# Patient Record
Sex: FEMALE | URBAN - METROPOLITAN AREA
[De-identification: names, ages, dates, MRNs, and addresses within clinical notes are randomized per-mention and may not be internally consistent; named-entity substitution may affect disease eponyms.]

---

## 2021-02-24 ENCOUNTER — APPOINTMENT (RX ONLY)
Dept: URBAN - METROPOLITAN AREA CLINIC 55 | Facility: CLINIC | Age: 71
Setting detail: DERMATOLOGY
End: 2021-02-24

## 2021-02-24 DIAGNOSIS — Z41.9 ENCOUNTER FOR PROCEDURE FOR PURPOSES OTHER THAN REMEDYING HEALTH STATE, UNSPECIFIED: ICD-10-CM

## 2021-02-24 PROCEDURE — ? FILLERS

## 2021-02-24 PROCEDURE — ? DYSPORT

## 2021-02-24 NOTE — PROCEDURE: DYSPORT
Additional Area 2 Units: 0
Show Lateral Platysmal Band Units: Yes
Glabellar Complex Units: 50
Show Mentalis Units: No
Detail Level: Simple
Additional Area 1 Location: chin
Dilution (U/0.1 Cc): 10
Post-Care Instructions: After care instructions were provided to the patient.
Expiration Date (Month Year): 9/30/2021
Consent: Written consent was obtained.
Lot #: [id number]

## 2021-02-24 NOTE — PROCEDURE: FILLERS
Include Cannula Information In Note?: No
Tear Troughs Filler Volume In Cc: 0
Anesthesia Type: 1% lidocaine with epinephrine
Filler Comments: Lips, ML, distal NLF
Include Cannula Size?: 27G
Detail Level: Simple
Filler Comments: TINY
Expiration Date (Month Year): 7/31/2023
Filler: Rocío Garcia
Map Statment: See Attach Map for Complete Details
Filler: Restylane Kysse
Anesthesia Volume In Cc: 0.5
Consent: Written consent was obtained.
Lot #: [id number]
Filler Comments: Cheeks
Post-Care Instructions: After care instructions were provided to the patient.
Lot #: [id number]

## 2021-09-14 ENCOUNTER — APPOINTMENT (RX ONLY)
Dept: URBAN - METROPOLITAN AREA CLINIC 55 | Facility: CLINIC | Age: 71
Setting detail: DERMATOLOGY
End: 2021-09-14

## 2021-09-14 DIAGNOSIS — Z41.9 ENCOUNTER FOR PROCEDURE FOR PURPOSES OTHER THAN REMEDYING HEALTH STATE, UNSPECIFIED: ICD-10-CM

## 2021-09-14 PROCEDURE — ? HYALURONIDASE INJECTION

## 2021-09-14 PROCEDURE — ? FILLERS

## 2021-09-14 PROCEDURE — ? DYSPORT

## 2021-09-14 ASSESSMENT — LOCATION SIMPLE DESCRIPTION DERM
LOCATION SIMPLE: LEFT CHEEK
LOCATION SIMPLE: RIGHT CHEEK

## 2021-09-14 ASSESSMENT — LOCATION DETAILED DESCRIPTION DERM
LOCATION DETAILED: LEFT SUPERIOR MEDIAL MALAR CHEEK
LOCATION DETAILED: RIGHT SUPERIOR CENTRAL MALAR CHEEK

## 2021-09-14 ASSESSMENT — LOCATION ZONE DERM: LOCATION ZONE: FACE

## 2021-09-14 NOTE — PROCEDURE: HYALURONIDASE INJECTION
Total Volume (Ccs): 0.2
Detail Level: Detailed
Hyaluronidase Preparation: hyaluronidase
Consent: The risks of contour defects and dimpling of the skin were reviewed with the patient prior to the injection.

## 2021-09-14 NOTE — PROCEDURE: FILLERS
Temple Hollows Filler Volume In Cc: 0
Include Cannula Information In Note?: Yes
Detail Level: Simple
Map Statment: See Attach Map for Complete Details
Include Cannula Information In Note?: No
Lot #: [id number]
Expiration Date (Month Year): 08/24/23
Include Cannula Size?: 27G
Expiration Date (Month Year): 08/28/23
Post-Care Instructions: After care instructions were provided to the patient.
Filler: RHA 3
Consent: Written consent was obtained.
Lot #: [id number]
Anesthesia Volume In Cc: 0.5
Anesthesia Type: 1% lidocaine with epinephrine
Filler: Restylane Defyne

## 2021-09-14 NOTE — PROCEDURE: DYSPORT
Glabellar Complex Units: 50
Show Ucl Units: No
Left Periorbital Skin Units: 0
Detail Level: Simple
Show Additional Area 5: Yes
Expiration Date (Month Year): 05/31/22
Post-Care Instructions: After care instructions were provided to the patient.
Lot #: [id number]
Consent: Written consent was obtained.
Dilution (U/0.1 Cc): 10
Additional Area 1 Location: upper lip

## 2021-09-28 ENCOUNTER — RX ONLY (OUTPATIENT)
Age: 71
Setting detail: RX ONLY
End: 2021-09-28

## 2021-09-28 ENCOUNTER — APPOINTMENT (RX ONLY)
Dept: URBAN - METROPOLITAN AREA CLINIC 55 | Facility: CLINIC | Age: 71
Setting detail: DERMATOLOGY
End: 2021-09-28

## 2021-09-28 DIAGNOSIS — Z41.9 ENCOUNTER FOR PROCEDURE FOR PURPOSES OTHER THAN REMEDYING HEALTH STATE, UNSPECIFIED: ICD-10-CM

## 2021-09-28 PROCEDURE — ? FILLERS

## 2021-09-28 RX ORDER — VALACYCLOVIR 500 MG/1
TABLET, FILM COATED ORAL
Qty: 10 | Refills: 1 | Status: ERX | COMMUNITY
Start: 2021-09-28

## 2021-09-28 NOTE — PROCEDURE: FILLERS
Jawline Filler Volume In Cc: 0
Filler: RHA 3
Include Cannula Size?: 27G
Include Cannula Information In Note?: No
Map Statment: See Attach Map for Complete Details
Lot #: [id number]
Expiration Date (Month Year): 01/24
Include Cannula Information In Note?: Yes
Anesthesia Volume In Cc: 0.5
Anesthesia Type: 1% lidocaine with epinephrine
Post-Care Instructions: After care instructions were provided to the patient.
Consent: Written consent was obtained.
Detail Level: Simple

## 2021-10-12 ENCOUNTER — APPOINTMENT (RX ONLY)
Dept: URBAN - METROPOLITAN AREA CLINIC 55 | Facility: CLINIC | Age: 71
Setting detail: DERMATOLOGY
End: 2021-10-12

## 2021-10-12 DIAGNOSIS — Z41.9 ENCOUNTER FOR PROCEDURE FOR PURPOSES OTHER THAN REMEDYING HEALTH STATE, UNSPECIFIED: ICD-10-CM

## 2021-10-12 PROCEDURE — ? INSTALIFT THREADING

## 2021-10-12 NOTE — PROCEDURE: INSTALIFT THREADING
Postcare Statement Text: There were no complications. The patient was given post care instructions and cold packs.
Post-Care Instructions (For The Patient Hand-Out): I reviewed with the patient in detail post-care instructions. Patient should apply ice packs multiple times a day for a couple days. They were instructed to call if they have any problems.
Procedure Text: An 18 gauge needle was used to create the insertions points and the needles with absorbable sutures were inserted subcutaneously in each direction and advanced through to the exit points on either side. The threads were then tightened and cut adjacent to the exit points and allowed to retract into the subcutaneous space.
Anesthesia Type: 1% lidocaine with epinephrine
Number Of Threads: 6
Detail Level: Zone
Anesthesia Method: local infiltration
Pre-Procedure Text: The treatment areas were prepped with alcohol and chlorhexidine. Insertion and exit points were mapped out with the Silhouette ruler and marked with a surgical marker.
Consent: The risks and benefits of the procedure were discussed with the patient.  Specifically the risks of swelling, bruising, bleeding, discomfort, infection, damage to nerves, numbness, allergic reactions, pigment changes, partial correction, rippling or dimpling, asymmetry, redness, bumps or nodules, visibility of threads, and scarring were reviewed. After this, consent was obtained.
Total Anesthesia Volume In Cc (Optional): 0

## 2021-10-21 ENCOUNTER — APPOINTMENT (RX ONLY)
Dept: URBAN - METROPOLITAN AREA CLINIC 55 | Facility: CLINIC | Age: 71
Setting detail: DERMATOLOGY
End: 2021-10-21

## 2021-10-21 ENCOUNTER — RX ONLY (OUTPATIENT)
Age: 71
Setting detail: RX ONLY
End: 2021-10-21

## 2021-10-21 RX ORDER — AMOXICILLIN AND CLAVULANATE POTASSIUM 875; 125 MG/1; MG/1
TABLET, FILM COATED ORAL
Qty: 14 | Refills: 0 | Status: ERX | COMMUNITY
Start: 2021-10-21

## 2021-10-21 RX ORDER — AMOXICILLIN AND CLAVULANATE POTASSIUM 875; 125 MG/1; 1/1
TABLET, FILM COATED ORAL
Qty: 14 | Refills: 0 | Status: CANCELLED

## 2022-05-25 ENCOUNTER — APPOINTMENT (RX ONLY)
Dept: URBAN - METROPOLITAN AREA CLINIC 55 | Facility: CLINIC | Age: 72
Setting detail: DERMATOLOGY
End: 2022-05-25

## 2022-05-25 DIAGNOSIS — Z41.9 ENCOUNTER FOR PROCEDURE FOR PURPOSES OTHER THAN REMEDYING HEALTH STATE, UNSPECIFIED: ICD-10-CM

## 2022-05-25 PROCEDURE — ? FILLERS

## 2022-05-25 PROCEDURE — ? DYSPORT

## 2022-05-25 NOTE — PROCEDURE: FILLERS
Include Cannula Information In Note?: No
Additional Area 1 Volume In Cc: 0
Map Statment: See Attach Map for Complete Details
Include Cannula Size?: 27G
Aspiration Statement: Aspiration was performed prior to injecting site with filler.
Expiration Date (Month Year): 10/11/2024
Consent: Written consent was obtained.
Lot #: [id number]
Lot #: [id number]
Filler: RHA 3
Anesthesia Type: 1% lidocaine with epinephrine
Include Cannula Information In Note?: Yes
Post-Care Instructions: After care instructions were provided to the patient.
Anesthesia Volume In Cc: 0.5
Detail Level: Simple

## 2022-05-25 NOTE — PROCEDURE: DYSPORT
R Brow Units: 0
Show Mentalis Units: No
Show Additional Area 3: Yes
Consent: Written consent was obtained.
Dilution (U/0.1 Cc): 10
Post-Care Instructions: After care instructions were provided to the patient.
Expiration Date (Month Year): 12/31/2022
Glabellar Complex Units: 50
Lot #: [id number]
Additional Area 1 Location: upper lip
Additional Area 2 Location: chin
Detail Level: Simple

## 2022-12-07 ENCOUNTER — APPOINTMENT (RX ONLY)
Dept: URBAN - METROPOLITAN AREA CLINIC 55 | Facility: CLINIC | Age: 72
Setting detail: DERMATOLOGY
End: 2022-12-07

## 2022-12-07 DIAGNOSIS — Z41.9 ENCOUNTER FOR PROCEDURE FOR PURPOSES OTHER THAN REMEDYING HEALTH STATE, UNSPECIFIED: ICD-10-CM

## 2022-12-07 PROCEDURE — ? INVENTORY

## 2022-12-07 PROCEDURE — ? DYSPORT

## 2022-12-07 PROCEDURE — ? FILLERS

## 2022-12-07 PROCEDURE — ? HYALURONIDASE INJECTION

## 2022-12-07 ASSESSMENT — LOCATION SIMPLE DESCRIPTION DERM
LOCATION SIMPLE: RIGHT CHEEK
LOCATION SIMPLE: LEFT CHEEK

## 2022-12-07 ASSESSMENT — LOCATION DETAILED DESCRIPTION DERM
LOCATION DETAILED: LEFT CENTRAL MALAR CHEEK
LOCATION DETAILED: RIGHT CENTRAL MALAR CHEEK

## 2022-12-07 ASSESSMENT — LOCATION ZONE DERM: LOCATION ZONE: FACE

## 2022-12-07 NOTE — PROCEDURE: FILLERS
Lateral Face Filler Volume In Cc: 0
Detail Level: Detailed
Consent was obtained.
Use Map Statement For Sites (Optional): No
Map Statment: See Attach Map for Complete Details
Filler: RHA 1
Post-Care Instructions: After care instructions were provided verbally and in writing.
Filler: RHA 3
Include Cannula Information In Note?: Yes
Anesthesia Type: 1% lidocaine with epinephrine
Anesthesia Volume In Cc: 0.5
Include Cannula Size?: 27G

## 2022-12-07 NOTE — PROCEDURE: DYSPORT
Nasal Root Units: 0
Show Right And Left Brow Units: No
Show Depressor Anguli Units: Yes
Forehead Units: 10
Glabellar Complex Units: 50
Detail Level: Detailed
Post-Care Instructions: Patient instructed to not lie down for 4 hours and limit physical activity for 24 hours.
Show Inventory Tab: Show
Consent was obtained.

## 2022-12-07 NOTE — PROCEDURE: HYALURONIDASE INJECTION
Detail Level: Detailed
Consent: The risks of contour defects and dimpling of the skin were reviewed with the patient prior to the injection.
Hyaluronidase Preparation: hyaluronidase
Total Volume (Ccs): 0.3

## 2023-01-18 ENCOUNTER — APPOINTMENT (RX ONLY)
Dept: URBAN - METROPOLITAN AREA CLINIC 55 | Facility: CLINIC | Age: 73
Setting detail: DERMATOLOGY
End: 2023-01-18

## 2023-01-18 DIAGNOSIS — Z41.9 ENCOUNTER FOR PROCEDURE FOR PURPOSES OTHER THAN REMEDYING HEALTH STATE, UNSPECIFIED: ICD-10-CM

## 2023-01-18 PROCEDURE — ? INVENTORY

## 2023-01-18 PROCEDURE — ? DAXXIFY

## 2023-01-18 PROCEDURE — ? HYALURONIDASE INJECTION

## 2023-01-18 PROCEDURE — ? FILLERS

## 2023-01-18 ASSESSMENT — LOCATION ZONE DERM: LOCATION ZONE: FACE

## 2023-01-18 ASSESSMENT — LOCATION DETAILED DESCRIPTION DERM
LOCATION DETAILED: RIGHT SUPERIOR CENTRAL MALAR CHEEK
LOCATION DETAILED: LEFT SUPERIOR MEDIAL MALAR CHEEK

## 2023-01-18 ASSESSMENT — LOCATION SIMPLE DESCRIPTION DERM
LOCATION SIMPLE: RIGHT CHEEK
LOCATION SIMPLE: LEFT CHEEK

## 2023-01-18 NOTE — PROCEDURE: FILLERS
Dorsal Hands Filler Volume In Cc: 0
Anesthesia Type: 1% lidocaine with epinephrine
Include Cannula Size?: 27G
Include Cannula Information In Note?: Yes
Anesthesia Volume In Cc: 0.5
Include Cannula Information In Note?: No
Detail Level: Detailed
Filler: RHA 4
Consent was obtained.
Map Statment: See Attach Map for Complete Details
Post-Care Instructions: After care instructions were provided verbally and in writing.

## 2023-01-18 NOTE — PROCEDURE: DAXXIFY
Show Masseter Units: Yes
Glabellar Complex Units: 0
Show Right And Left Periorbital Units: No
Detail Level: Detailed
Post-Care Instructions: Patient was instructed to avoid rubbing or pressure on the area for 4 hours.
Show Inventory Tab: Show
Periorbital Skin Units: 32
Dilution (U/0.1 Cc): 4
Consent was obtained.

## 2023-01-18 NOTE — PROCEDURE: HYALURONIDASE INJECTION
Total Volume (Ccs): 0.1
Detail Level: Detailed
Consent: The risks of contour defects and dimpling of the skin were reviewed with the patient prior to the injection.
Hyaluronidase Preparation: hyaluronidase

## 2023-03-14 ENCOUNTER — APPOINTMENT (RX ONLY)
Dept: URBAN - METROPOLITAN AREA CLINIC 55 | Facility: CLINIC | Age: 73
Setting detail: DERMATOLOGY
End: 2023-03-14

## 2023-03-14 DIAGNOSIS — Z41.9 ENCOUNTER FOR PROCEDURE FOR PURPOSES OTHER THAN REMEDYING HEALTH STATE, UNSPECIFIED: ICD-10-CM

## 2023-03-14 PROCEDURE — ? IN-HOUSE DISPENSING PHARMACY

## 2023-03-14 PROCEDURE — ? INVENTORY

## 2023-03-14 PROCEDURE — ? COSMETIC CONSULTATION: GENERAL

## 2023-03-14 NOTE — PROCEDURE: IN-HOUSE DISPENSING PHARMACY
Name Of Product 1: Anti-Aging Brightening Cream
Product 34 Refills: 0
Product 1 Refills: 2
Product 57 Unit Type: mg
Product 2 Refills: 11
Detail Level: Zone
Product 4 Unit Type: ml
Product 5 Units Dispensed: 1
Product 4 Amount/Unit (Numbers Only): 30
Product 6 Application Directions: Apply nightly or as directed. Use SPF daily.
Send Charges To Patient Encounter: No
Name Of Product 4: Hydroquinone 8% Emulsion
Render Refills If Set To 0: Yes
Name Of Product 7: Lidocaine 23% / Tetracaine 7% Cream
Name Of Product 6: Rosacea Triple Combination Gel
Name Of Product 2: Dapsone / Spironolactone Gel
Product 2 Application Directions: Apply nightly or as directed.
Name Of Product 3: Acne Triple Combination Gel
Product 7 Application Directions: Apply only as directed
Name Of Product 5: Hydrating Tretinoin 0.025% Cream

## 2023-04-11 ENCOUNTER — APPOINTMENT (RX ONLY)
Dept: URBAN - METROPOLITAN AREA CLINIC 55 | Facility: CLINIC | Age: 73
Setting detail: DERMATOLOGY
End: 2023-04-11

## 2023-04-11 DIAGNOSIS — Z41.9 ENCOUNTER FOR PROCEDURE FOR PURPOSES OTHER THAN REMEDYING HEALTH STATE, UNSPECIFIED: ICD-10-CM

## 2023-04-11 PROCEDURE — ? FILLERS

## 2023-04-11 PROCEDURE — ? DAXXIFY

## 2023-04-11 PROCEDURE — ? IN-HOUSE DISPENSING PHARMACY

## 2023-04-11 PROCEDURE — ? INVENTORY

## 2023-04-11 NOTE — PROCEDURE: FILLERS
Additional Area 3 Volume In Cc: 0
Anesthesia Volume In Cc: 0.5
Include Cannula Information In Note?: Yes
Detail Level: Detailed
Include Cannula Information In Note?: No
Include Cannula Size?: 27G
Post-Care Instructions: After care instructions were provided verbally and in writing.
Consent was obtained.
Filler: RHA Redensity
Map Statment: See Attach Map for Complete Details
Anesthesia Type: 1% lidocaine with epinephrine

## 2023-04-11 NOTE — PROCEDURE: IN-HOUSE DISPENSING PHARMACY
Product 68 Units Dispensed: 0
Name Of Product 3: Acne Triple Combination Gel
Product 80 Unit Type: mg
Product 5 Refills: 11
Product 7 Application Directions: Apply only as directed
Name Of Product 2: Dapsone / Spironolactone Gel
Product 4 Refills: 2
Product 4 Application Directions: Apply nightly or as directed.
Product 5 Amount/Unit (Numbers Only): 30
Detail Level: Zone
Name Of Product 5: Hydrating Tretinoin 0.025% Cream
Product 6 Unit Type: ml
Render Product Pricing In Note: Yes
Product 5 Application Directions: Apply nightly or as directed. Use SPF daily.
Name Of Product 4: Hydroquinone 8% Emulsion
Name Of Product 7: Lidocaine 23% / Tetracaine 7% Cream
Send Charges To Patient Encounter: No
Name Of Product 6: Rosacea Triple Combination Gel
Product 5 Units Dispensed: 1
Name Of Product 1: Anti-Aging Brightening Cream

## 2023-04-11 NOTE — PROCEDURE: DAXXIFY
Show Levator Superior Units: Yes
Left Periorbital Skin Units: 0
Forehead Units: 8
Show Right And Left Pupillary Line Units: No
Detail Level: Detailed
Glabellar Complex Units: 40
Show Inventory Tab: Show
Consent was obtained.
Periorbital Skin Units: 32
Dilution (U/0.1 Cc): 4
Post-Care Instructions: Patient was instructed to avoid rubbing or pressure on the area for 4 hours.

## 2023-12-14 ENCOUNTER — APPOINTMENT (RX ONLY)
Dept: URBAN - METROPOLITAN AREA CLINIC 55 | Facility: CLINIC | Age: 73
Setting detail: DERMATOLOGY
End: 2023-12-14

## 2023-12-14 DIAGNOSIS — Z41.9 ENCOUNTER FOR PROCEDURE FOR PURPOSES OTHER THAN REMEDYING HEALTH STATE, UNSPECIFIED: ICD-10-CM

## 2023-12-14 PROCEDURE — ? FILLERS

## 2023-12-14 PROCEDURE — ? DAXXIFY

## 2023-12-14 PROCEDURE — ? INVENTORY

## 2023-12-14 NOTE — PROCEDURE: FILLERS
Decollete Filler Volume In Cc: 0
Post-Care Instructions: After care instructions were provided verbally and in writing.
Include Cannula Information In Note?: No
Detail Level: Detailed
Filler: RHA Redensity
Map Statment: See Attach Map for Complete Details
Include Cannula Information In Note?: Yes
Include Cannula Size?: 27G
Anesthesia Type: 1% lidocaine with epinephrine
Anesthesia Volume In Cc: 0.5
Consent was obtained.

## 2023-12-14 NOTE — PROCEDURE: DAXXIFY
[facility] Units: 0
Show Lcl Units: No
Show Additional Area 2: Yes
Forehead Units: 8
Detail Level: Detailed
Glabellar Complex Units: 40
Show Inventory Tab: Show
Consent was obtained.
Post-Care Instructions: Patient was instructed to avoid rubbing or pressure on the area for 4 hours.
Dilution (U/0.1 Cc): 4
Periorbital Skin Units: 32

## 2024-05-07 ENCOUNTER — APPOINTMENT (RX ONLY)
Dept: URBAN - METROPOLITAN AREA CLINIC 55 | Facility: CLINIC | Age: 74
Setting detail: DERMATOLOGY
End: 2024-05-07

## 2024-05-07 ENCOUNTER — RX ONLY (OUTPATIENT)
Age: 74
Setting detail: RX ONLY
End: 2024-05-07

## 2024-05-07 DIAGNOSIS — Z41.9 ENCOUNTER FOR PROCEDURE FOR PURPOSES OTHER THAN REMEDYING HEALTH STATE, UNSPECIFIED: ICD-10-CM

## 2024-05-07 PROCEDURE — ? FILLERS

## 2024-05-07 PROCEDURE — ? INVENTORY

## 2024-05-07 PROCEDURE — ? DAXXIFY

## 2024-05-07 RX ORDER — VALACYCLOVIR 500 MG/1
TABLET, FILM COATED ORAL
Qty: 30 | Refills: 1 | Status: ERX | COMMUNITY
Start: 2024-05-07

## 2024-05-07 NOTE — PROCEDURE: DAXXIFY
Left Periorbital Skin Units: 0
Show Anterior Platysmal Band Units: Yes
Show Right And Left Pupillary Line Units: No
Forehead Units: 8
Detail Level: Detailed
Dilution (U/0.1 Cc): 4
Glabellar Complex Units: 40
Show Inventory Tab: Show
Consent was obtained.
Periorbital Skin Units: 32
Post-Care Instructions: Patient was instructed to avoid rubbing or pressure on the area for 4 hours.
Bill Summary Price Listed Below, Or Bill Total Of Units X Price Per Unit?: Bill Summary Price Below

## 2024-05-07 NOTE — PROCEDURE: FILLERS
Additional Area 4 Volume In Cc: 0
Include Documentation That Aspiration Was Performed Prior To Injecting Filler:: No
Include Cannula Information In Note?: Yes
Anesthesia Type: 1% lidocaine with epinephrine
Anesthesia Volume In Cc: 0.5
Include Cannula Size?: 27G
Consent was obtained.
Post-Care Instructions: After care instructions were provided verbally and in writing.
Detail Level: Detailed
Filler: RHA Redensity
Filler: RHA 4
Map Statment: See Attach Map for Complete Details

## 2025-01-14 ENCOUNTER — APPOINTMENT (OUTPATIENT)
Dept: URBAN - METROPOLITAN AREA CLINIC 55 | Facility: CLINIC | Age: 75
Setting detail: DERMATOLOGY
End: 2025-01-14

## 2025-01-14 DIAGNOSIS — Z41.9 ENCOUNTER FOR PROCEDURE FOR PURPOSES OTHER THAN REMEDYING HEALTH STATE, UNSPECIFIED: ICD-10-CM

## 2025-01-14 PROCEDURE — ? DAXXIFY

## 2025-01-14 PROCEDURE — ? INVENTORY

## 2025-01-14 PROCEDURE — ? FILLERS

## 2025-01-14 NOTE — PROCEDURE: FILLERS
Include Documentation That Aspiration Was Performed Prior To Injecting Filler:: No
Decollete Filler Volume In Cc: 0
Include Cannula Information In Note?: Yes
Anesthesia Type: 1% lidocaine with epinephrine
Include Cannula Size?: 27G
Anesthesia Volume In Cc: 0.5
Filler: RHA 3
Detail Level: Detailed
Consent was obtained.
Filler: RHA Redensity
Post-Care Instructions: After care instructions were provided verbally and in writing.
Map Statment: See Attach Map for Complete Details

## 2025-01-14 NOTE — PROCEDURE: DAXXIFY
Masseter Units: 0
Show Forehead Units: Yes
Show Right And Left Brow Units: No
Dilution (U/0.1 Cc): 4
Consent was obtained.
Forehead Units: 8
Post-Care Instructions: Patient was instructed to avoid rubbing or pressure on the area for 4 hours.
Detail Level: Detailed
Glabellar Complex Units: 40
Show Inventory Tab: Show
Bill Summary Price Listed Below, Or Bill Total Of Units X Price Per Unit?: Bill Summary Price Below

## 2025-04-30 ENCOUNTER — APPOINTMENT (OUTPATIENT)
Dept: URBAN - METROPOLITAN AREA CLINIC 55 | Facility: CLINIC | Age: 75
Setting detail: DERMATOLOGY
End: 2025-04-30

## 2025-04-30 DIAGNOSIS — Z41.9 ENCOUNTER FOR PROCEDURE FOR PURPOSES OTHER THAN REMEDYING HEALTH STATE, UNSPECIFIED: ICD-10-CM

## 2025-04-30 PROCEDURE — ? HYALURONIDASE INJECTION

## 2025-04-30 PROCEDURE — ? INVENTORY

## 2025-04-30 PROCEDURE — ? FILLERS

## 2025-04-30 PROCEDURE — ? DAXXIFY

## 2025-04-30 ASSESSMENT — LOCATION SIMPLE DESCRIPTION DERM: LOCATION SIMPLE: LEFT CHEEK

## 2025-04-30 ASSESSMENT — LOCATION DETAILED DESCRIPTION DERM: LOCATION DETAILED: LEFT SUPERIOR CENTRAL MALAR CHEEK

## 2025-04-30 ASSESSMENT — LOCATION ZONE DERM: LOCATION ZONE: FACE

## 2025-04-30 NOTE — PROCEDURE: FILLERS
Temple Hollows Filler Volume In Cc: 0
Anesthesia Type: 1% lidocaine with epinephrine
Anesthesia Volume In Cc: 0.5
Include Cannula Size?: 27G
Include Cannula Information In Note?: No
Include Cannula Information In Note?: Yes
Detail Level: Detailed
Post-Care Instructions: After care instructions were provided verbally and in writing.
Consent was obtained.
Map Statment: See Attach Map for Complete Details
Filler: RHA 4

## 2025-04-30 NOTE — PROCEDURE: DAXXIFY
Show Orbicularis Oculi Units: Yes
Price Per Unit In $ (Use Numbers Only, No Text Please.): 0
Show Mentalis Units: No
Dilution (U/0.1 Cc): 4
Forehead Units: 12
Consent was obtained.
Glabellar Complex Units: 40
Detail Level: Detailed
Post-Care Instructions: Patient was instructed to avoid rubbing or pressure on the area for 4 hours.
Show Inventory Tab: Show
Bill Summary Price Listed Below, Or Bill Total Of Units X Price Per Unit?: Bill Summary Price Below

## 2025-04-30 NOTE — PROCEDURE: HYALURONIDASE INJECTION
Hyaluronidase Preparation: hyaluronidase
Consent: The risks of contour defects and dimpling of the skin were reviewed with the patient prior to the injection.
Total Volume (Ccs): 0.4
Expiration Date (Optional): 09/2025
Lot # (Optional): [id number]
Detail Level: Detailed

## 2025-05-07 ENCOUNTER — APPOINTMENT (OUTPATIENT)
Dept: URBAN - METROPOLITAN AREA CLINIC 55 | Facility: CLINIC | Age: 75
Setting detail: DERMATOLOGY
End: 2025-05-07

## 2025-05-07 DIAGNOSIS — Z41.9 ENCOUNTER FOR PROCEDURE FOR PURPOSES OTHER THAN REMEDYING HEALTH STATE, UNSPECIFIED: ICD-10-CM

## 2025-05-07 PROCEDURE — ? INVENTORY

## 2025-05-07 PROCEDURE — ? HYALURONIDASE INJECTION

## 2025-05-07 PROCEDURE — ? COSMETIC FOLLOW-UP

## 2025-05-07 PROCEDURE — ? FILLERS

## 2025-05-07 ASSESSMENT — LOCATION DETAILED DESCRIPTION DERM: LOCATION DETAILED: LEFT CENTRAL MALAR CHEEK

## 2025-05-07 ASSESSMENT — LOCATION SIMPLE DESCRIPTION DERM: LOCATION SIMPLE: LEFT CHEEK

## 2025-05-07 ASSESSMENT — LOCATION ZONE DERM: LOCATION ZONE: FACE

## 2025-05-07 NOTE — PROCEDURE: FILLERS
Marionette Lines Filler Volume In Cc: 0
Include Cannula Information In Note?: No
Anesthesia Type: 1% lidocaine with epinephrine
Include Cannula Size?: 27G
Include Cannula Information In Note?: Yes
Anesthesia Volume In Cc: 0.5
Detail Level: Detailed
Consent was obtained.
Post-Care Instructions: After care instructions were provided verbally and in writing.
Filler: Restylane Eyelight
Filler: RHA 4
Map Statment: See Attach Map for Complete Details

## 2025-05-07 NOTE — PROCEDURE: HYALURONIDASE INJECTION
Total Volume (Ccs): 0.2
Hyaluronidase Preparation: hyaluronidase
Consent: The risks of contour defects and dimpling of the skin were reviewed with the patient prior to the injection.
Lot # (Optional): [id number]
Detail Level: Detailed
Expiration Date (Optional): 09/2025